# Patient Record
Sex: FEMALE | Race: WHITE | HISPANIC OR LATINO | URBAN - METROPOLITAN AREA
[De-identification: names, ages, dates, MRNs, and addresses within clinical notes are randomized per-mention and may not be internally consistent; named-entity substitution may affect disease eponyms.]

---

## 2023-02-09 ENCOUNTER — OFFICE VISIT (OUTPATIENT)
Dept: NEUROLOGY | Facility: CLINIC | Age: 38
End: 2023-02-09
Payer: COMMERCIAL

## 2023-02-09 DIAGNOSIS — F95.2 TOURETTE SYNDROME: Primary | ICD-10-CM

## 2023-02-09 PROCEDURE — 99204 PR OFFICE/OUTPT VISIT, NEW, LEVL IV, 45-59 MIN: ICD-10-PCS | Mod: 95,,, | Performed by: PSYCHIATRY & NEUROLOGY

## 2023-02-09 PROCEDURE — 99204 OFFICE O/P NEW MOD 45 MIN: CPT | Mod: 95,,, | Performed by: PSYCHIATRY & NEUROLOGY

## 2023-02-09 RX ORDER — TOPIRAMATE 25 MG/1
25 TABLET ORAL DAILY
Qty: 30 TABLET | Refills: 5 | Status: SHIPPED | OUTPATIENT
Start: 2023-02-09 | End: 2024-02-09

## 2023-02-09 NOTE — PROGRESS NOTES
"Neurology Telemedicine Note     Name: Joy Clinton  MRN: [id number]   CSN: [id number]      Date: 02/09/2023    The patient location is: Home  The chief complaint leading to consultation is: new patient tics  Visit type: Virtual visit with synchronous audio and video    TOTAL TIME SPENT: 50 min    Each patient to whom I provide medical services by telemedicine is:  (1) informed of the relationship between the physician and patient and the respective role of any other health care provider with respect to management of the patient; and (2) notified that they may decline to receive medical services by telemedicine and may withdraw from such care at any time.    History of Present Illness (HPI):   36 yo with "motor tics" since she was 4 years old, was told by her father than she and her older brother (and his daughter) and father have Tourette's (told by him).  She recognizes multiple motor tics per minute - head tilting, shoulder shrugging, forehead/brow furrowing - seems to be associated with effort for relief and ends in pain.  Somewhat suppressible, some worsening.  Urge of stretch, but not clear other urges.  No vocalizations (dad and brother do), maybe a humming now but clearly had humming as a child.  No sniffing or throat clearing    No changes with age of the tics (dad's got worsened). Associated with more concentration issues, attention wandering as she has aged.  Also sub or unconsciously.  Not treated formally.  Unclear if improves with caffeine.  OCD is present with pattern recognition and shapes and sounds.  Says her "other tics" is her OCD-like persistence and compulsions.  NO risk taking behaviors. Improvement with massage and "release" with therapeutic benefit.  Has been treated with SSRI and benzos in the past, Ambien for sleep.  All helped mood, none helped tics.    Also has a history of headaches - from tension to migraine.  Take Fioricet with some benefit for abortive care.  Not on a HA " preventive.    Worked as a .  Stopped a year ago.  No kids.  No effect of EtOH.  Used THC with no effect positive or negative.      Nonmotor/Premotor ROS: as per HPI, and all other systems are negative    Past Medical History: The patient  has a past medical history of Family history of colon cancer.    Social History: The patient  reports that she has never smoked. She has never used smokeless tobacco. She reports current alcohol use of about 2.0 standard drinks per week. She reports that she does not use drugs.    Family History: Their family history includes Cancer (age of onset: 49) in her father; Diabetes in her father.    Allergies: Patient has no known allergies.     Meds: NO LONGER TAKING, LIST IS HISTORICAL  Current Outpatient Medications on File Prior to Visit   Medication Sig Dispense Refill    rizatriptan (MAXALT) 5 MG tablet Take 1 tablet (5 mg total) by mouth as needed for Migraine. May repeat dose in 2 hrs as needed.  Max daily dose is 30mg 10 tablet 0    trazodone (DESYREL) 50 MG tablet Take 1 tablet (50 mg total) by mouth every evening. 10 tablet 0    zolpidem (AMBIEN) 10 mg Tab Take 5 mg by mouth nightly as needed.       No current facility-administered medications on file prior to visit.       Exam:  Vital Signs deferred with home visit    Constitutional  Well-developed, well-nourished, appears stated age   Eyes  No scleral icterus   ENT  Moist oral mucosa   Cardiovascular  No lower extremity edema    Respiratory  No labored breathing    Skin  No rashes   Hematologic  No bruising   Psychiatric  Normal mood and affect   Other  GI/ deferred    Neurological     * Mental status  Alert and oriented to person, place, time, and situation; no dysarthria; no aphasia; normal recent and remote memory; follows commands   * Cranial nerves     - CN II  Pupils midposition and symmetric   - CN III, IV, VI  Extraocular movements full, no nystagmus visualized   - CN V  Unable to test   - CN VII  Face  strong and symmetric bilaterally    - CN VIII  Hearing grossly intact with conversation    - CN IX, X  Palate raises midline and symmetric    - CN XI  Strong shoulder posture B   - CN XII  Tongue appears midline    * Motor  Normal bulk by appearance, no drift    * Sensory  Not tested objectively, no changes described by the patient   * Deep tendon reflexes  Not tested   * Coord/Movemt/Gait No hypophonic speech.  No facial masking.  No B bradykinesia.  No tremor with rest, posture, kinesis, or intention.   No chorea, athetosis, dystonia, myoclonus.   No motor impersistence.  Gait is deferred for safety.    Multiple motor tics: forehead flexion/wrinkle, eyes widen, mild oculogyrics, head tilt to left?right (but both), L>R shoulder shrug         Medical Record Review:  - Lab Results:  No visits with results within 3 Month(s) from this visit.   Latest known visit with results is:   No results found for any previous visit.       Diagnoses:   1) Tourette's syndrome.  Meets criteria (age of onset, motor tics, vocal tic, >1 year, stable, ADD/OCD-like tendencies)  2) Migraines    Medical Decision Makin) we discussed the best practices below, decided to trial TPM 25 daily.  Risks and benefits including weight loss, taste, glaucoma, and renal stones discussed.  2) considering structural imaging to rule out secondary causes  3) considering genetic analysis, likely [facility]  4) will see in-person in 3-4 months      Practice Guidelines for Tourette syndrome (TS) / Tourettism    Tics  - TS is common, affecting approximately 1% of schoolchildren.    - Tics begin in childhood and demonstrate waxing and waning course. Peak tic severity usually occurs between the ages of 10 and 12 years, with many children experiencing an improvement in tics in adolescence. In one study, 18% of adolescents older than 16 years have no tics and 60% having minimal or mild tics 6 years after initial examination.  - There is no evidence  that treatment is more effective the earlier it is started. As tics may improve with time, watchful waiting is an acceptable approach in individuals who do not experience any functional impairment from their tics.    ADHD  - Comorbid attention-deficit/hyperactivity disorder (ADHD) is common in people with TS, with prevalence ranging from 30% to 50%.  - Comorbid ADHD is strongly associated with functional impairment in children with TS.  While ADHD symptoms may improve in adolescence, adults with TS may require ongoing care for this comorbidity.    OCD/Anxiety  - Obsessive-compulsive behaviors are common in people with TS, with a comorbid diagnosis of obsessive-compulsive disorder (OCD) in up to 50%.  - People with TS are at high risk of other psychiatric comorbidities, including anxiety disorders, oppositional defiant disorder, and mood disorders.  - Comorbid mood disorders appear more prevalent in adolescents and adults than children and in those with greater tic severity.    Treatment is designed to address the predominant symptom.  1) Watchful waiting may be best.  2) Cognitive Behavioral Intervention for Tics Therapy (CBIT) may help tics and OCD/Anxiety.  3) Stimulants/atomoxetine may help ADHD and usually do NOT worsen tics  4) Alpha-2 adrenergic agonists (clonidine, guanfacine) for tics, and they can usually be safely combined with stimulants.  May cause sleepiness and lower blood pressure with both agents, and QTc prolongation may be seen with guanfacine extended release.  Abrupt withdrawal of alpha-2 adrenergic agonists may cause rebound hypertension.  5) Topiramate may help tic severity and is generally well tolerated at low doses (25 to 150 mg/d).  It may cause adverse effects, including cognitive and language problems, somnolence, and weight loss, and may increase the risk of renal stones.  6) Neuroleptic agents to tics (haloperidol, pimozide, risperidone, aripiprazole, ziprasidone, and others) can reduce  tic severity.  There is a higher risk of drug-induced movement disorders, weight gain, sleepiness, elevated prolactin with some. There is a higher risk of QT prolongation with pimozide.  7) VMAT2 inhibitors that reduce dopamine release (tetrabenazine, deutetrabenazine, and valbenazine) may reduce severity of tics.  Side effects include somnolence, worsening depression, and QTc prolongation.  8) Botulinum toxin injections (Botox and others) may be helpful for bothersome and relatively isolated simple tics associated with pain.  The primary risk is weakness in the distribution of the injection.  9) Deep brain stimulation (DBS) is considered in refractory patients.    (Adapted from multiple sources, including ©2019 American Academy of Neurology, [employer])         Aamir Stafford MD, MPH  Division of Movement and Memory Disorders  [facility]  [phone]

## 2024-12-03 ENCOUNTER — OFFICE VISIT (OUTPATIENT)
Dept: PAIN MEDICINE | Facility: CLINIC | Age: 39
End: 2024-12-03
Attending: ANESTHESIOLOGY
Payer: COMMERCIAL

## 2024-12-03 VITALS
DIASTOLIC BLOOD PRESSURE: 69 MMHG | TEMPERATURE: 98 F | WEIGHT: 130.5 LBS | HEART RATE: 83 BPM | BODY MASS INDEX: 23.12 KG/M2 | SYSTOLIC BLOOD PRESSURE: 107 MMHG

## 2024-12-03 DIAGNOSIS — G89.29 CHRONIC NONINTRACTABLE HEADACHE, UNSPECIFIED HEADACHE TYPE: Primary | ICD-10-CM

## 2024-12-03 DIAGNOSIS — R51.9 CHRONIC NONINTRACTABLE HEADACHE, UNSPECIFIED HEADACHE TYPE: Primary | ICD-10-CM

## 2024-12-03 DIAGNOSIS — J32.9 SINUSITIS, UNSPECIFIED CHRONICITY, UNSPECIFIED LOCATION: ICD-10-CM

## 2024-12-03 PROCEDURE — 3078F DIAST BP <80 MM HG: CPT | Mod: CPTII,S$GLB,, | Performed by: ANESTHESIOLOGY

## 2024-12-03 PROCEDURE — 3008F BODY MASS INDEX DOCD: CPT | Mod: CPTII,S$GLB,, | Performed by: ANESTHESIOLOGY

## 2024-12-03 PROCEDURE — 99999 PR PBB SHADOW E&M-EST. PATIENT-LVL III: CPT | Mod: PBBFAC,,, | Performed by: ANESTHESIOLOGY

## 2024-12-03 PROCEDURE — 99204 OFFICE O/P NEW MOD 45 MIN: CPT | Mod: S$GLB,,, | Performed by: ANESTHESIOLOGY

## 2024-12-03 PROCEDURE — 1160F RVW MEDS BY RX/DR IN RCRD: CPT | Mod: CPTII,S$GLB,, | Performed by: ANESTHESIOLOGY

## 2024-12-03 PROCEDURE — 3074F SYST BP LT 130 MM HG: CPT | Mod: CPTII,S$GLB,, | Performed by: ANESTHESIOLOGY

## 2024-12-03 PROCEDURE — 1159F MED LIST DOCD IN RCRD: CPT | Mod: CPTII,S$GLB,, | Performed by: ANESTHESIOLOGY

## 2024-12-03 NOTE — PROGRESS NOTES
Subjective:      Patient ID: Joy Clinton is a 39 y.o. female.    Chief Complaint: Migraine    Referred by: Self, Aaareferral     Initial HPI (12/3/2024)  Joy Clinton is coming to the clinic for achy sharp headaches that is mainly on both sides of the head. There is a dull pressure like sensation behind her eyes. The headache started on the 17 th Nov after a 3 days period of having a fever. The headache waxes and wanes which is worse in the mornings and worse with looking at her computer screen. Patient had one episode of vomiting but denied any light sensitivity, persistent nausea or difficulties with balance or ambulation. Patient said she has a history of migraine which she uses Fioricet. Her typical headaches usually starts at the base of her skull. Patient was given a steroid pack and antibiotic course for a sinus infection. Her symptoms have been improving slowly.     Pain Interventions:  None    Relevant Surgeries:  None    Conservative Treatment  - Physical Therpy: None  - Home Exercise Program: None  - Chiropractic: None     Medications  - Opioids:  - NSAIDS/Tylenol: Motrin   - Muscle Relaxers:    - Neuropathic agents:   - Topicals:   - Blood thinners:     Relevant Imaging  PROCEDURE: [facility] [place] HEAD WITHOUT CONTRAST    FINDINGS:  There is no evidence for acute cortical-based ischemia.  No mass effect or edema.  Essentially empty sella.  No acute intracranial hemorrhage is identified.  The ventricles are symmetric and appropriate.  No acute depressed calvarial fracture.  Minimal opacification of the right sphenoid sinus. Mastoids are predominantly clear. Right-sided nasal septal deviation and spurring.    IMPRESSION:  No acute intracranial abnormality.    Essentially empty sella, of uncertain clinical significance. Sometimes this can be associated with idiopathic intracranial hypertension although there are no other specific CT findings to suggest this diagnosis and there is no evidence for acutely  raised intracranial pressure. Minimal opacification of the right sphenoid sinus.     Past Medical History:   Diagnosis Date    Family history of colon cancer     father     Past Surgical History:   Procedure Laterality Date    WISDOM TOOTH EXTRACTION       Review of patient's allergies indicates:  No Known Allergies    Current Outpatient Medications   Medication Sig Dispense Refill    topiramate (TOPAMAX) 25 MG tablet Take 1 tablet (25 mg total) by mouth once daily. 30 tablet 5     No current facility-administered medications for this visit.     Family History   Problem Relation Name Age of Onset    Cancer Father  49        colon    Diabetes Father         Social History     Socioeconomic History    Marital status:    Occupational History    Occupation:    Tobacco Use    Smoking status: Never    Smokeless tobacco: Never   Substance and Sexual Activity    Alcohol use: Yes     Alcohol/week: 2.0 standard drinks of alcohol     Types: 2 Cans of beer per week    Drug use: No    Sexual activity: Yes     Partners: Male     Birth control/protection: I.U.D.   Social History Narrative    The patient does exercise regularly (walks a few miles a day).    Rates diet as fair.    She is not satisfied with weight.         Social Drivers of Health     Financial Resource Strain: Low Risk  (4/9/2024)    Received from [facility] [place]    Overall Financial Resource Strain (CARDIA)     Difficulty of Paying Living Expenses: Not very hard   Food Insecurity: No Food Insecurity (4/9/2024)    Received from [facility] [place]    Hunger Vital Sign     Worried About Running Out of Food in the Last Year: Never true     Ran Out of Food in the Last Year: Never true   Transportation Needs: No Transportation Needs (4/9/2024)    Received from [facility]    PRAPARE - Transportation     Lack of Transportation (Medical): No     Lack of Transportation (Non-Medical): No   Physical Activity: Sufficiently Active (4/9/2024)    Received from [facility] [place]     Exercise Vital Sign     Days of Exercise per Week: 5 days     Minutes of Exercise per Session: 30 min   Stress: Stress Concern Present (4/9/2024)    Received from [facility]    [employer] [place] [employer] - Occupational Stress Questionnaire     Feeling of Stress : Very much   Housing Stability: Low Risk  (4/9/2024)    Received from [facility]    Housing Stability Vital Sign     Unable to Pay for Housing in the Last Year: No     Number of Places Lived in the Last Year: 1     Unstable Housing in the Last Year: No     Review of Systems   HENT: Negative.     Eyes: Negative.    Cardiovascular: Negative.    Respiratory: Negative.     Endocrine: Negative.    Skin: Negative.    Musculoskeletal: Negative.    Gastrointestinal: Negative.    Genitourinary: Negative.    Neurological:  Positive for headaches.   Psychiatric/Behavioral: Negative.           Objective:   /69 (Patient Position: Sitting)   Pulse 83   Temp 97.5 °F (36.4 °C)   Wt 59.2 kg (130 lb 8.2 oz)   BMI 23.12 kg/m²   Pain Disability Index Review:      12/3/2024     7:09 AM   Last 3 PDI Scores   Pain Disability Index (PDI) 48     PHYSICAL EXAM   Vitals:    12/03/24 0707   BP: 107/69   Pulse: 83   Temp: 97.5 °F (36.4 °C)     GENERAL: Alert and Oriented x3  H&N: NC/AT  CV/PULM: Regular rate. Normal WOB. Symmetric chest rise. No peripheral edema.  SKIN: Intact. No visible rashes or lesions.     MUSCULOSKELETAL/NEUROLGIC:  INSPECTION: Limbs are grossly symmetric. Normal muscle bulk for age. No obvious deformities.  SENSORY: Intact and symmetric to light touch distally in BUE and BLE  MOTOR:Upper Extremity Manual Muscle Testing (*= weakness due to pain)   Right Left   Elbow Flexion 5/5 5/5   Elbow Extension 5/5 5/5   Wrist Extension 5/5 5/5   Finger Flexion () 5/5 5/5   Finger Abduction 5/5 5/5   Shoulder Abduction 5/5 5/5   Shoulder Flexion 5/5 5/5     Lower Extremity Manual Muscle Testing   Right Left   Hip Flexion 5/5 5/5   Knee  "Extension 5/5 5/5   Ankle Dorsiflexion 5/5 5/5   Ankle Plantarflexion 5/5 5/5   Great Toe Extension (EHL) 5/5 5/5   Knee Flexion 5/5 5/5     DTR:    Right Left   Biceps 2 2   Brachioradialis 2 2   Triceps 2 2   Patellar 2 2   Achilles 2 2       Upper Track Signs:    Right Left   Eckert's  Negative Negative   Babinski Response Down Going  Down Going    Ankle Clonus No clonus  No Clonus        CERVICAL SPINE:   INSPECTION: No obvious abnormalities.  PALPATION: Non tender to palpation over:   ROM: WNL  Spurling's: Negative  Facet Loading: Negative  Llhermitte's: negative Negative    LUMBAR SPINE:  INSPECTION: No gross atrophy or deformity   PALPATION: Non tender to palpation  ROM: No limitations  SLR: Negative  Femoral Stretch: Negative  Facet loading: Negative  FADIR: Negative  WESLEY: Negative  Sacral Distraction: Negative  SI Thigh Thrust: Negative  Sacral compression: Negative  Sacral Thrust: Negative  Gaenslen's: Negative  Yeomen's: Negative    (Knee, Shoulder, Ankle, Elbow, Hand)  INSPECTION: wnl  PALPATION: non tender   ROM: no limitations  Ortho/SPM Exam      Assessment:       Encounter Diagnoses   Name Primary?    Chronic nonintractable headache, unspecified headache type Yes    Sinusitis, unspecified chronicity, unspecified location          Plan:   We discussed with the patient the assessment and recommendations. The following is the plan we agreed on:        Joy Torres" was seen today for migraine.    Diagnoses and all orders for this visit:    Chronic nonintractable headache, unspecified headache type    Sinusitis, unspecified chronicity, unspecified location       CT head reviewed and discussed with patient.   Follow up with headache specialist on 13 th Jan.   Continue with current medications if they are beneficial.   Discussed occipital nerve blocks in the office. Patient will call for appointment.   Follow up as needed.     Antoni Smith MD PGY-5  Interventional Pain Medicine Fellow   [facility]     I have personally taken the history and examined this patient and agree with the fellow's note as stated above.

## 2025-01-13 ENCOUNTER — OFFICE VISIT (OUTPATIENT)
Facility: CLINIC | Age: 40
End: 2025-01-13
Payer: COMMERCIAL

## 2025-01-13 VITALS
HEART RATE: 87 BPM | SYSTOLIC BLOOD PRESSURE: 104 MMHG | WEIGHT: 129.94 LBS | BODY MASS INDEX: 23.02 KG/M2 | DIASTOLIC BLOOD PRESSURE: 73 MMHG

## 2025-01-13 DIAGNOSIS — G43.009 MIGRAINE WITHOUT AURA AND WITHOUT STATUS MIGRAINOSUS, NOT INTRACTABLE: Primary | ICD-10-CM

## 2025-01-13 PROCEDURE — 1159F MED LIST DOCD IN RCRD: CPT | Mod: CPTII,S$GLB,, | Performed by: NURSE PRACTITIONER

## 2025-01-13 PROCEDURE — 1160F RVW MEDS BY RX/DR IN RCRD: CPT | Mod: CPTII,S$GLB,, | Performed by: NURSE PRACTITIONER

## 2025-01-13 PROCEDURE — 3074F SYST BP LT 130 MM HG: CPT | Mod: CPTII,S$GLB,, | Performed by: NURSE PRACTITIONER

## 2025-01-13 PROCEDURE — 99999 PR PBB SHADOW E&M-EST. PATIENT-LVL III: CPT | Mod: PBBFAC,,, | Performed by: NURSE PRACTITIONER

## 2025-01-13 PROCEDURE — 3078F DIAST BP <80 MM HG: CPT | Mod: CPTII,S$GLB,, | Performed by: NURSE PRACTITIONER

## 2025-01-13 PROCEDURE — 3008F BODY MASS INDEX DOCD: CPT | Mod: CPTII,S$GLB,, | Performed by: NURSE PRACTITIONER

## 2025-01-13 PROCEDURE — 99214 OFFICE O/P EST MOD 30 MIN: CPT | Mod: S$GLB,,, | Performed by: NURSE PRACTITIONER

## 2025-01-13 PROCEDURE — G2211 COMPLEX E/M VISIT ADD ON: HCPCS | Mod: S$GLB,,, | Performed by: NURSE PRACTITIONER

## 2025-01-13 RX ORDER — RIMEGEPANT SULFATE 75 MG/75MG
75 TABLET, ORALLY DISINTEGRATING ORAL DAILY PRN
Qty: 8 TABLET | Refills: 2 | Status: SHIPPED | OUTPATIENT
Start: 2025-01-13

## 2025-01-13 RX ORDER — BUTALBITAL, ACETAMINOPHEN AND CAFFEINE 300; 40; 50 MG/1; MG/1; MG/1
2 CAPSULE ORAL
COMMUNITY

## 2025-01-13 RX ORDER — IBUPROFEN 200 MG
200 TABLET ORAL EVERY 6 HOURS PRN
COMMUNITY

## 2025-01-13 RX ORDER — ZOLPIDEM TARTRATE 10 MG/1
10 TABLET ORAL
COMMUNITY
Start: 2012-01-01

## 2025-01-13 RX ORDER — ALPRAZOLAM 0.25 MG/1
0.25 TABLET ORAL DAILY PRN
COMMUNITY
Start: 2024-04-17

## 2025-01-13 NOTE — PROGRESS NOTES
NEW PATIENT CONSULT  SUBJECTIVE:  Patient ID: Joy Clinton   MRN: [id number]  Referred By: Aaareferral Self  Chief Complaint: Consult    History of Present Illness:   39 y.o. female with migraines, anxiety, insomnia, who presents to clinic alone for evaluation of headaches.  Had 12 day migraine which was precipitated by fever, aches, chills/cough, fatigue for 3 days.  Was tested for COVID which was negative.  Was treating with ibuprofen, tylenol, aspirin, fioricet, none of which helped her migraine.  Was seen in ED on 11/24, CT Head negative, was given multiple IV analgesics, then hydromorphone eventually helped, however migraine returned late that evening.  Was discharged home with medrol dosepack, percocet, zofran.  Was PCP 3 days later, was given samples nurtec, took it twice, unsure whether or not it was helping as she took it during the time pain was already subsiding.  Pain eventually subsided after 12 days.    Long history of migraine, now back to her baseline frequency 1-2 per week.  At onset of migraine, she typically takes ibuprofen 800 mg and drinks water, after an hour if migraine is still present, will treat with Fioricet.  50% of her migraines will respond to ibuprofen 800 mg.      Otherwise, information below is still accurate and current.     Notes from outside Neurology:  History of Present Illness:   39 y.o. female with past medical history significant for anxiety, depression, migraine headaches. Patient referred for evaluation of migraines. She reports a long-time history of migraine headaches. Headaches tend to be sporadic and accompanied with bifrontal and occipital pain. At times accompanied by nausea when severe. Denies light or sound sensitivity. Resting typically helps with the headaches. In the past she has been tried on Imitrex which she had to discontinue due to significant side effects of dizziness. At 1 point she was also placed on Topamax for prevention which was ineffective. She was  referred here from the emergency room after she dealt with 12 straight days of severe migraine headaches. The headaches were associated with fevers to up to 101°. She had head CTs in the emergency room which were negative. Eventually the patient was placed on antibiotics and started on Nurtec and that is when the headaches started improving. Patient is back to her usual migraine frequency. Patient reports headaches approximately 1 to twice a week. She thinks that some of these headaches might be tension headaches rather than migraines. She typically takes NSAIDs initially for her headaches. If the headaches persist then she might take a Fioricet and sleep the headache off when she gets home.  Assessment/Plan:  39-year-old female with longstanding history of migraine headaches who recently suffer from 12 straight days of severe migraine headaches. This bout of severe migraine headaches was likely triggered by her infectious/inflammatory process. She is back to having her usual migraine frequency. We had a lengthy discussion regarding potential treatment options. Advised to start using migraine abortive therapy as soon as her headache starts rather than a stepwise approach in therapy. Patient has been given samples of Nurtec and Ubrelvy to use as needed and she will let the office know which 1 of these medications is most effective.    Therapies Tried & Failed:  Sumatriptan - intolerable side effects  Rizatriptan - intolerable side effects   Nurtec -  given today     Current Medications:    ALPRAZolam (XANAX) 0.25 MG tablet, Take 0.25 mg by mouth daily as needed., Disp: , Rfl:     butalbital-acetaminophen-caff -40 mg Cap, Take 2 capsules by mouth., Disp: , Rfl:     ibuprofen (ADVIL,MOTRIN) 200 MG tablet, Take 200 mg by mouth every 6 (six) hours as needed., Disp: , Rfl:     levonorgestreL (MIRENA) 52 mg IUD, , Disp: , Rfl:     zolpidem (AMBIEN) 10 mg Tab, Take 10 mg by mouth., Disp: , Rfl:     rimegepant (NURTEC)  75 mg odt, Take 1 tablet (75 mg total) by mouth daily as needed for Migraine. Place ODT tablet on the tongue; alternatively the ODT tablet may be placed under the tongue, Disp: 8 tablet, Rfl: 2    Review of Systems - A review of 10+ systems was conducted with pertinent positive and negative findings documented in HPI with all other systems reviewed and negative.    PFSH: Past medical, family, and social history reviewed as documented in chart with pertinent positive medical, family, and social history detailed in HPI.    OBJECTIVE:  Vitals:  /73 (Patient Position: Sitting)   Pulse 87   Wt 59 kg (129 lb 15.4 oz)   BMI 23.02 kg/m²      Physical Exam:  Constitutional: she appears well-developed and well-nourished. she is well groomed. NAD     Review of Data:   Notes from pain medicine, familiy medicine, ED visit reviewed via care everywhere  Labs:  No visits with results within 6 Month(s) from this visit.   Latest known visit with results is:   No results found for any previous visit.   (ABNORMAL) CBC with Differential (11/24/2024 2:45 PM CST)  Lab Results - (ABNORMAL) CBC with Differential (11/24/2024 2:45 PM CST)  Component Value Ref Range Test Method Analysis Time Performed At Pathologist Signature   WBC 5.1 4.5 - 11.0 103/uL   11/24/2024 2:58 PM [facility] MAIN LAB     RBC 3.71 (L) 4.00 - 5.20 106/uL   11/24/2024 2:58 PM [facility] MAIN LAB     Hemoglobin 11.9 (L) 12.0 - 16.0 gm/dL   11/24/2024 2:58 PM [facility] MAIN LAB     Hematocrit 34.1 (L) 35.0 - 46.0 %   11/24/2024 2:58 PM [facility] MAIN LAB     MCV 91.8 80.0 - 100.0 fL   11/24/2024 2:58 PM [facility] MAIN LAB     MCH 32.1 26.0 - 34.0 pg   11/24/2024 2:58 PM [facility] MAIN LAB     MCHC 35.0 31.0 - <37.0 g/dL   11/24/2024 2:58 PM [facility] MAIN LAB     RDW 12.1 11.5 - 14.5  %   11/24/2024 2:58 PM [facility] MAIN LAB     Platelet Count 260 130 - 400 103/uL   11/24/2024 2:58 PM [facility] MAIN LAB     MPV 8.2 7.4 - 10.4 fL   11/24/2024 2:58 PM [facility] MAIN LAB     Neutrophils Absolute - Instrument 3.30 1.80 - 8.00 103/uL   11/24/2024 2:58 PM [facility] MAIN LAB     Lymphocytes Absolute - Instrument 1.40 1.10 - 5.00 103/uL   11/24/2024 2:58 PM [facility] MAIN LAB     Monocytes Absolute - Instrument 0.30 0.20 - 1.10 103/uL   11/24/2024 2:58 PM [facility] MAIN LAB     Eosinophils Absolute - Instrument 0.00 0.00 - 0.60 103/uL   11/24/2024 2:58 PM [facility] MAIN LAB     Basophils Absolute - Instrument 0.00 0.00 - 0.20 103/uL   11/24/2024 2:58 PM [facility] MAIN LAB     Neutrophils Percent - Instrument 64.5 %   11/24/2024 2:58 PM [facility] MAIN LAB     Lymphocytes Percent - Instrument 27.6 %   11/24/2024 2:58 PM [facility] MAIN LAB     Monocytes Percent - Instrument 6.8 %   11/24/2024 2:58 PM [facility] MAIN LAB     Eosinophils Percent - Instrument 0.9 %   11/24/2024 2:58 PM [facility] MAIN LAB     Basophils Percent - Instrument 0.2 %   11/24/2024 2:58 PM [facility] MAIN LAB     MDW 20.8 (H) <20.0 %   11/24/2024 2:58 PM [facility] MAIN LAB    (ABNORMAL) CMP (11/24/2024 10:36 AM [facility])  Lab Results - (ABNORMAL) CMP (11/24/2024 10:36 AM [facility])  Component Value Ref Range Test Method Analysis Time Performed At [facility]   Sodium 138 135 - 146 mmol/L   11/24/2024 2:56 PM [facility] MAIN LAB     Potassium 4.0 3.6 - 5.2 mmol/L   11/24/2024 2:56 PM CST [facility] MAIN LAB     Chloride 103 96 -  110 mmol/L   11/24/2024 2:56 PM [facility] MAIN LAB     Carbon Dioxide 26 24 - 32 mmol/L   11/24/2024 2:56 PM [facility] MAIN LAB     Glucose 96 65 - 99 mg/dL   11/24/2024 2:56 PM [facility] MAIN LAB     Calcium 8.8 8.4 - 10.3 mg/dL   11/24/2024 2:56 PM [facility] MAIN LAB     BUN 8.0 7.0 - 25.0 mg/dL   11/24/2024 2:56 PM [facility] MAIN LAB     Creatinine 0.97 0.50 - 1.10 mg/dL   11/24/2024 2:56 PM [facility] MAIN LAB     Total Protein 7.5 6.0 - 8.0 g/dL   11/24/2024 2:56 PM [facility] MAIN LAB     Albumin 4.3 3.4 - 5.0 g/dL   11/24/2024 2:56 PM [facility] MAIN LAB     AST 73 (H) <45 U/L   11/24/2024 2:56 PM [facility] MAIN LAB      (H) <46 U/L   11/24/2024 2:56 PM [facility] MAIN LAB     Alkaline Phosphatase 49 20 - 120 U/L   11/24/2024 2:56 PM [facility] MAIN LAB     Bilirubin, Total 0.4 <1.3 mg/dL   11/24/2024 2:56 PM [facility] MAIN LAB     EGFR 75 (L) >=90 mL/min/1.73m2   11/24/2024 2:56 PM [facility] MAIN LAB    B-hCG, Qualitative (11/24/2024 10:36 AM [facility])  Lab Results - B-hCG, Qualitative (11/24/2024 10:36 AM [facility])  Component Value Ref Range Test Method Analysis Time Performed At [facility] Signature   Beta-HCG Qualitative Negative Negative [facility] LAB MANUAL  11/24/2024 11:18 AM [facility] MAIN LAB     PROCALCITONIN (11/24/2024 10:26 AM CST)  Lab Results - PROCALCITONIN (11/24/2024 10:26 AM CST)  Component Value Ref Range Test Method Analysis Time Performed At Pathologist Signature   Procalcitonin <0.05 <0.05 ng/mL   11/24/2024 3:03 PM CST [facility] MAIN LAB      Imaging:  No results found for this or any previous visit.  Note: I have  independently reviewed any/all imaging/labs/tests and agree with the report (s) as documented.  Any discrepancies will be as noted/demarcated by free text.  AKOSUA CRAVEN 1/13/2025    ASSESSMENT:  1. Migraine without aura and without status migrainosus, not intractable      PLAN:  - Not interested in starting med for migraine prevention at this time  - For acute migraine - given nurtec 75 mg odt   - Has fioricet available for rescue  - Start tracking headaches via Migraine Robbie halima on phone   - Risks, benefits, and potential side effects of nurtec discussed  - Alternative treatment options offered   - RTC in 6-12 months or sooner if needed          Questions and concerns were sought and answered to the patient's stated verbal satisfaction.  The patient verbalizes understanding and agreement with the above stated treatment plan.     Visit today included increased complexity associated with the care of the episodic problem migraine without aura addressed and managing the longitudinal care of the patient due to the serious and/or complex managed problem(s).   Plan for patient to return to clinic in 6 months for follow-up visit.     CC: No primary care provider on file.      AKOSUA Lorenzana  [facility] [place]  [phone]    Dr. Orozco was available during today's encounter.